# Patient Record
Sex: MALE | Race: WHITE | Employment: FULL TIME | URBAN - METROPOLITAN AREA
[De-identification: names, ages, dates, MRNs, and addresses within clinical notes are randomized per-mention and may not be internally consistent; named-entity substitution may affect disease eponyms.]

---

## 2023-01-09 ENCOUNTER — OFFICE VISIT (OUTPATIENT)
Dept: OCCUPATIONAL MEDICINE | Age: 23
End: 2023-01-09

## 2023-01-09 VITALS
DIASTOLIC BLOOD PRESSURE: 82 MMHG | OXYGEN SATURATION: 98 % | HEART RATE: 75 BPM | SYSTOLIC BLOOD PRESSURE: 134 MMHG | RESPIRATION RATE: 18 BRPM

## 2023-01-09 DIAGNOSIS — M54.9 ACUTE LEFT-SIDED BACK PAIN, UNSPECIFIED BACK LOCATION: Primary | ICD-10-CM

## 2023-01-09 ASSESSMENT — ENCOUNTER SYMPTOMS: BACK PAIN: 1

## 2023-01-09 NOTE — PROGRESS NOTES
PROGRESS NOTE    SUBJECTIVE:   Jocelyne Henriquez is a 20 y.o. male seen for ____. Chief Complaint    Back Pain         Patient presents to clinic today with complaint of left mid back pain for approximately the past 2 weeks. He states that he doesn't recall an exact moment where he could have injured his back, just states he woke one morning at home and was having some back discomfort. He states that he is physically active at home and at work and has continued with his normal activities despite the pain assuming it would resolve on its own. He does admit to chopping and loading wood at home over the weekend that seemed to worsen his back pain. He states today while at work he was lifting a heavy tray and the back pain worsened at that time. He states as he has moved around through the day the back pain has seemed to improve but he states that if he sits down or gets still the back pain will worsen. He states the pain is very \"tight\" feeling in nature. He denies any OTC medication or home intervention. He does state a history of back pain; injured while playing football in high school. He states for that episode he saw a chiropractor for an adjusted with completely alleviated his back pain at that time. He denies any numbness, tingling, gait difficulty, urinary symptoms. No current outpatient medications on file. No current facility-administered medications for this visit. No Known Allergies    Social History     Tobacco Use    Smoking status: Never    Smokeless tobacco: Current        Review of Systems   Genitourinary: Negative. Musculoskeletal:  Positive for back pain (mid lumbar, left of spine). Negative for gait problem. Skin:  Negative for rash and wound. OBJECTIVE:  /82   Pulse 75   Resp 18   SpO2 98%      No results found for this visit on 01/09/23. Physical Exam  Constitutional:       General: He is not in acute distress.      Appearance: He is not ill-appearing. Cardiovascular:      Rate and Rhythm: Normal rate and regular rhythm. Pulses: Normal pulses. Pulmonary:      Effort: Pulmonary effort is normal.   Musculoskeletal:      Cervical back: Normal.      Thoracic back: Normal. No swelling, edema, deformity or signs of trauma. Normal range of motion. Lumbar back: Tenderness (mid, left of spine) present. No swelling, edema, deformity or signs of trauma. Normal range of motion. No scoliosis. Back:    Skin:     General: Skin is warm and dry. Neurological:      Mental Status: He is alert and oriented to person, place, and time. Sensory: Sensation is intact. Motor: Motor function is intact. No weakness. Coordination: Coordination is intact. Gait: Gait is intact. Psychiatric:         Attention and Perception: Attention normal.         Mood and Affect: Mood and affect normal.         Speech: Speech normal.         Behavior: Behavior normal. Behavior is cooperative. Thought Content: Thought content normal.         Cognition and Memory: Cognition normal.         Judgment: Judgment normal.       ASSESSMENT and PLAN    Dheeraj Garcia was seen today for back pain. Diagnoses and all orders for this visit:    Acute left-sided back pain, unspecified back location    Discussed findings of physical examination with patient. Discussed likelihood of musculoskeletal strain/overuse. Educated on OTC therapies and home management including anti-inflammatories conservatively as needed, rest, gentle stretching and range of motion exercises, alternate heat/ice compress, epsom salt soaks, massage. Discussed proper posture and good back mechanics, specifically when lifting. Educated on symptoms that would require urgent evaluation including but not limited to numbness/tingling of extremities, worsening back pain despite interventions, urinary symptoms, gait abnormalities.  Encouraged patient to return to clinic for any new or worsening symptoms as needed. Patient verbalized understanding and was agreeable with the outlined plan of care. Counseling provided on benefits of having a primary care provider which includes but is not limited to continuity of care and having a medical home when concerns arise. Also enforced that onsite clinic policy states that we are not to take the place of a primary care provider. Patientt verbalized understanding. I have reviewed the patient's medication list, past medical, family, social, and surgical history in detail and updated the patient record appropriately.     Rd Rod, APRN - CNP

## 2023-03-17 ENCOUNTER — OFFICE VISIT (OUTPATIENT)
Dept: OCCUPATIONAL MEDICINE | Age: 23
End: 2023-03-17

## 2023-03-17 DIAGNOSIS — Z00.00 WELLNESS EXAMINATION: Primary | ICD-10-CM

## 2023-03-17 NOTE — PROGRESS NOTES
PROGRESS NOTE    SUBJECTIVE:   Parminder Glynn is a 20 y.o. male seen for ____. Chief Complaint    Other         HPI    Patient presents to clinic for quarterly wellness screening as required by employer to receive incentive. Patient voices no complaints or concerns at this time. No current outpatient medications on file. No current facility-administered medications for this visit. No Known Allergies    Social History     Tobacco Use    Smoking status: Never    Smokeless tobacco: Current        Review of Systems       OBJECTIVE:  There were no vitals taken for this visit. No results found for this visit on 03/17/23. Physical Exam    ASSESSMENT and PLAN    Radha Hollis was seen today for other. Diagnoses and all orders for this visit:    Wellness examination      Discussed/reviewed employee wellness program with patient. Discussed all pertinent clinical examination findings/results with patient as applicable. Patient has set personal goal to quit using chewing tobacco. At present he is trying to quit cold turkey. He is currently chewing half a can daily. He stopped for 2 days and then \"out of habit\" relapsed and had a chew one day. His goal is to gradually reduce the amount he chews daily until total cessation. We reviewed in depth tobacco cessation and will evaluate progress at next wellness screening. Patient voiced no questions or concerns at present. Patient is encouraged to return to clinic as needed. Counseling provided on benefits of having a primary care provider which includes but is not limited to continuity of care and having a medical home when concerns arise. Also enforced that onsite clinic policy states that we are not to take the place of a primary care provider. Patientt verbalized understanding. I have reviewed the patient's medication list, past medical, family, social, and surgical history in detail and updated the patient record appropriately.     Lyn Sexton, APRN - CNP

## 2023-06-06 ENCOUNTER — OFFICE VISIT (OUTPATIENT)
Dept: OCCUPATIONAL MEDICINE | Age: 23
End: 2023-06-06

## 2023-06-06 VITALS
WEIGHT: 235.5 LBS | DIASTOLIC BLOOD PRESSURE: 84 MMHG | HEART RATE: 102 BPM | HEIGHT: 71 IN | OXYGEN SATURATION: 98 % | BODY MASS INDEX: 32.97 KG/M2 | SYSTOLIC BLOOD PRESSURE: 124 MMHG | RESPIRATION RATE: 18 BRPM

## 2023-06-06 DIAGNOSIS — Z00.00 WELLNESS EXAMINATION: Primary | ICD-10-CM

## 2023-06-06 NOTE — PROGRESS NOTES
PROGRESS NOTE    SUBJECTIVE:   Lang Perry is a 20 y.o. male seen for ____. Chief Complaint    Other         HPI    Patient presents to clinic for quarterly wellness screening as required by employer to receive incentive. Patient voices no complaints or concerns at this time. Patient has been working to quit chewing tobacco and set a goal to accomplish this at the last quarterly meeting. At this time he is still using chewing tobacco but has significantly reduced the amount that he chews. He is currently down to approximately one chew daily. He states he has noticed some small improvement in his overall well being with this reduction. He is still working towards his goal of complete cessation. He has expressed the desire to lose a little bit of weight. He states because of this his wife has begun cooking healthier meals at home consisting of lean proteins, vegetables. He does verbalized that he usually does not eat breakfast; only lunch and dinner daily. He works a physical job outdoors and remains very active in extracurricular activities such as hunting and dog running that require a good amount of walking/running each weekend. He consumes approximately 8-10 bottles of water on \"hot days\" and uses 2 Gatorade powder mix ins daily for electrolyte replacement. Reviewed labs from recent employee health screening; all within normal limits. No current outpatient medications on file. No current facility-administered medications for this visit. No Known Allergies    Social History     Tobacco Use    Smoking status: Never    Smokeless tobacco: Current        Review of Systems   All other systems reviewed and are negative. OBJECTIVE:  /84   Pulse (!) 102   Resp 18   Ht 5' 11\" (1.803 m)   Wt 235 lb 8 oz (106.8 kg)   SpO2 98%   BMI 32.85 kg/m²      No results found for this visit on 06/06/23.     Physical Exam  Constitutional:       General: He is not in acute

## 2023-09-01 ENCOUNTER — OFFICE VISIT (OUTPATIENT)
Dept: OCCUPATIONAL MEDICINE | Age: 23
End: 2023-09-01

## 2023-09-01 VITALS — SYSTOLIC BLOOD PRESSURE: 122 MMHG | OXYGEN SATURATION: 98 % | DIASTOLIC BLOOD PRESSURE: 86 MMHG | HEART RATE: 66 BPM

## 2023-09-01 DIAGNOSIS — Z00.00 WELLNESS EXAMINATION: Primary | ICD-10-CM

## 2023-09-01 NOTE — PROGRESS NOTES
PROGRESS NOTE    SUBJECTIVE:   Alicia Walker is a 20 y.o. male seen for quarterly wellness exam    Chief Complaint    Other         Other      Patient presents to clinic for quarterly wellness screening as required by employer to receive incentive. Patient voices no complaints or concerns at this time. Patient states he is working towards his goal of quitting chewing tobacco. He states he has significantly cut back and will make a can last 3-4 days. He states he is working on cutting back small amounts at a time until he is able to be tobacco free. He states he rarely gets cravings during the day, typically only at night after dinner. He states he and his wife have began cooking healthier meals at home including lean proteins and veggies. He states they want to start adding in a nightly walk to their routine to incorporate more exercise. He states he is staying well hydrated in the heat and includes electrolyte replacement drinks to help avoid dehydration. No current outpatient medications on file. No current facility-administered medications for this visit. No Known Allergies    Social History     Tobacco Use    Smoking status: Never    Smokeless tobacco: Current        Review of Systems   All other systems reviewed and are negative. OBJECTIVE:  /86   Pulse 66   SpO2 98%      No results found for this visit on 09/01/23. Physical Exam  Constitutional:       Appearance: Normal appearance. Cardiovascular:      Rate and Rhythm: Normal rate and regular rhythm. Pulses: Normal pulses. Heart sounds: Normal heart sounds. Pulmonary:      Effort: Pulmonary effort is normal.      Breath sounds: Normal breath sounds. Neurological:      Mental Status: He is alert. Psychiatric:         Mood and Affect: Mood normal.         Behavior: Behavior normal.         Thought Content:  Thought content normal.         Judgment: Judgment normal.       ASSESSMENT and PLAN    Rosa Bethea was

## 2023-11-28 ENCOUNTER — OFFICE VISIT (OUTPATIENT)
Dept: OCCUPATIONAL MEDICINE | Age: 23
End: 2023-11-28

## 2023-11-28 VITALS — HEART RATE: 68 BPM | DIASTOLIC BLOOD PRESSURE: 88 MMHG | OXYGEN SATURATION: 98 % | SYSTOLIC BLOOD PRESSURE: 126 MMHG

## 2023-11-28 DIAGNOSIS — Z00.00 WELLNESS EXAMINATION: Primary | ICD-10-CM

## 2023-11-28 NOTE — PROGRESS NOTES
PROGRESS NOTE    SUBJECTIVE:   Serg Martinez is a 24 y.o. male seen for     Chief Complaint    wellness screening         HPI    Patient presents to clinic for quarterly wellness screening as required by employer to receive incentive. Patient voices no complaints or concerns at this time. Patient states he is still working on cutting out chewing tobacco. He states he stays well hydrated and has continued with healthy eating habits. No current outpatient medications on file. No current facility-administered medications for this visit. No Known Allergies    Social History     Tobacco Use    Smoking status: Never    Smokeless tobacco: Current        Review of Systems   All other systems reviewed and are negative. OBJECTIVE:  /88   Pulse 68   SpO2 98%      No results found for this visit on 11/28/23. Physical Exam    ASSESSMENT and PLAN    Yogi Jansen was seen today for wellness screening. Diagnoses and all orders for this visit:    Wellness examination        Discussed/reviewed employee wellness program with patient. Discussed all pertinent clinical examination findings/results with patient as applicable. Patient voiced no questions or concerns at present. Patient is encouraged to return to clinic as needed. Counseling provided on benefits of having a primary care provider which includes but is not limited to continuity of care and having a medical home when concerns arise. Also enforced that onsite clinic policy states that we are not to take the place of a primary care provider. Patient verbalized understanding. I have reviewed the patient's medication list, past medical, family, social, and surgical history in detail and updated the patient record appropriately.     BHAKTI Post

## 2024-04-05 ENCOUNTER — OFFICE VISIT (OUTPATIENT)
Age: 24
End: 2024-04-05

## 2024-04-05 VITALS
BODY MASS INDEX: 32.9 KG/M2 | HEIGHT: 71 IN | WEIGHT: 235 LBS | DIASTOLIC BLOOD PRESSURE: 78 MMHG | RESPIRATION RATE: 15 BRPM | SYSTOLIC BLOOD PRESSURE: 123 MMHG

## 2024-04-05 DIAGNOSIS — Z00.00 WELLNESS EXAMINATION: Primary | ICD-10-CM

## 2024-04-05 NOTE — PROGRESS NOTES
PROGRESS NOTE    SUBJECTIVE:   Mike Dave is a 23 y.o. male seen for quarterly wellness examination.    Chief Complaint    Wellness Program         Client presents to the clinic for quarterly wellness visit. He reports feeling well today and denies any chief complaints.       No current outpatient medications on file.     No current facility-administered medications for this visit.      No Known Allergies    Social History     Tobacco Use    Smoking status: Never    Smokeless tobacco: Current        Review of Systems   All other systems reviewed and are negative.         OBJECTIVE:  /78 (Site: Left Upper Arm, Position: Sitting)   Resp 15   Ht 1.803 m (5' 11\")   Wt 106.6 kg (235 lb)   BMI 32.78 kg/m²      No results found for this visit on 04/05/24.    Physical Exam  Constitutional:       Appearance: He is obese.   Cardiovascular:      Rate and Rhythm: Normal rate and regular rhythm.      Heart sounds: Normal heart sounds.   Pulmonary:      Effort: Pulmonary effort is normal.      Breath sounds: Normal breath sounds.   Psychiatric:         Behavior: Behavior is cooperative.       ASSESSMENT and PLAN    Mike was seen today for wellness program.    Diagnoses and all orders for this visit:    Wellness examination    Client and I discussed the importance of a healthy diet and getting in adequate exercise. His goal for the next 3 months is to begin weight lifting 2-3 times per week. We discussed the importance of weight bearing activities to increase/maintain muscle mass. He is also going to increase fruit and vegetable intake along with lean protein intake. We discussed having hard boiled eggs for breakfast and the importance of not skipping meals. Client stopped drinking soft drinks a few weeks after previously drinking multiple a day. He reports feeling much better since he stopped drinking soft drinks. He is more tired in the mornings. We discussed trying an 8 oz cup of coffee in the morning.

## 2024-09-03 ENCOUNTER — OFFICE VISIT (OUTPATIENT)
Age: 24
End: 2024-09-03

## 2024-09-03 VITALS
RESPIRATION RATE: 16 BRPM | BODY MASS INDEX: 34.42 KG/M2 | DIASTOLIC BLOOD PRESSURE: 89 MMHG | OXYGEN SATURATION: 97 % | WEIGHT: 246.8 LBS | HEART RATE: 93 BPM | SYSTOLIC BLOOD PRESSURE: 138 MMHG

## 2024-09-03 DIAGNOSIS — Z00.00 WELLNESS EXAMINATION: Primary | ICD-10-CM

## 2024-09-03 ASSESSMENT — ENCOUNTER SYMPTOMS: RESPIRATORY NEGATIVE: 1

## 2024-09-03 NOTE — PROGRESS NOTES
9/3/2024    Mike Dave (:  2000) is a 23 y.o. male, here for quarterly wellness examination for insurance purposes offered through his company.    Subjective   There is no problem list on file for this patient.  Client reports feeling well today and denies any chief complaints.     Review of Systems   Constitutional: Negative.    Respiratory: Negative.     Cardiovascular: Negative.    Psychiatric/Behavioral: Negative.       Prior to Visit Medications    Not on File        No Known Allergies    History reviewed. No pertinent past medical history.    Past Surgical History:   Procedure Laterality Date    HERNIA REPAIR      TONSILLECTOMY       History reviewed. No pertinent family history.      Vitals:    24 0849   BP: 138/89   Site: Right Upper Arm   Position: Sitting   Pulse: 93   Resp: 16   SpO2: 97%   Weight: 111.9 kg (246 lb 12.8 oz)     Estimated body mass index is 34.42 kg/m² as calculated from the following:    Height as of 24: 1.803 m (5' 11\").    Weight as of this encounter: 111.9 kg (246 lb 12.8 oz).       Objective   Physical Exam  Constitutional:       Appearance: He is obese.   Cardiovascular:      Rate and Rhythm: Normal rate and regular rhythm.      Heart sounds: Normal heart sounds.   Pulmonary:      Effort: Pulmonary effort is normal.      Breath sounds: Normal breath sounds.   Neurological:      Mental Status: He is alert and oriented to person, place, and time.   Psychiatric:         Mood and Affect: Mood normal.         Behavior: Behavior normal.          No data to display              Assessment & Plan  Wellness examination   Client presents to the clinic regarding quarterly wellness examination. His wellness goal is to increase fruit and vegetable intake and to eat healthier. He eats a lot of sandwiches and chips. We discussed swapping these out for healthier choices. We also discussed getting adequate amounts of exercise; this may include walking with his wife in

## 2024-10-15 ENCOUNTER — OFFICE VISIT (OUTPATIENT)
Age: 24
End: 2024-10-15

## 2024-10-15 VITALS
HEIGHT: 71 IN | SYSTOLIC BLOOD PRESSURE: 128 MMHG | HEART RATE: 60 BPM | OXYGEN SATURATION: 98 % | DIASTOLIC BLOOD PRESSURE: 84 MMHG | WEIGHT: 245.4 LBS | BODY MASS INDEX: 34.35 KG/M2

## 2024-10-15 DIAGNOSIS — Z00.8 ENCOUNTER FOR BIOMETRIC SCREENING: Primary | ICD-10-CM

## 2024-10-15 ASSESSMENT — ENCOUNTER SYMPTOMS: RESPIRATORY NEGATIVE: 1

## 2024-10-15 NOTE — PROGRESS NOTES
PROGRESS NOTE    SUBJECTIVE:   Mike Dave is a 23 y.o. male seen for biometric screening.    Chief Complaint    Biometric Screening           History provided by:  Patient  History limited by: none.   used: No      No current outpatient medications on file.     No current facility-administered medications for this visit.      No Known Allergies    Social History     Tobacco Use   • Smoking status: Never   • Smokeless tobacco: Current        Review of Systems   Constitutional: Negative.    Respiratory: Negative.     Cardiovascular: Negative.    Neurological: Negative.         OBJECTIVE:  /84 (Site: Right Upper Arm, Position: Sitting)   Pulse 60   Ht 1.803 m (5' 11\")   Wt 111.3 kg (245 lb 6.4 oz)   SpO2 98%   BMI 34.23 kg/m²      No results found for this visit on 10/15/24.    Physical Exam  Constitutional:       Appearance: Normal appearance. He is normal weight.   Cardiovascular:      Rate and Rhythm: Normal rate and regular rhythm.      Heart sounds: Normal heart sounds.   Pulmonary:      Effort: Pulmonary effort is normal.      Breath sounds: Normal breath sounds.   Neurological:      Mental Status: He is oriented to person, place, and time.   Psychiatric:         Mood and Affect: Mood normal.         Behavior: Behavior normal.         Thought Content: Thought content normal.         Judgment: Judgment normal.     ASSESSMENT and PLAN    Mike was seen today for biometric screening.    Diagnoses and all orders for this visit:    Encounter for biometric screening  -     Comprehensive Metabolic Panel  -     CBC with Auto Differential  -     Lipid Panel W/ Chol/HDL Ratio  -     Hemoglobin A1C  -     Thyroid Cascade Profile  -     PSA, Diagnostic    Client presents to the clinic for biometric screening today. He reports feeling well and denies any chief complaints. Labs obtained through venipuncture in [facility] with no issue. Client will follow up in clinic on 10/22/2024 at 7:00 a.m. to

## 2024-10-16 LAB
BASOPHILS # BLD AUTO: 0 X10E3/UL (ref 0–0.2)
BASOPHILS NFR BLD AUTO: 0 %
CHOLEST SERPL-MCNC: 165 MG/DL (ref 100–199)
CHOLEST/HDLC SERPL: 3.4 RATIO (ref 0–5)
EOSINOPHIL # BLD AUTO: 0.1 X10E3/UL (ref 0–0.4)
EOSINOPHIL NFR BLD AUTO: 1 %
ERYTHROCYTE [DISTWIDTH] IN BLOOD BY AUTOMATED COUNT: 11.7 % (ref 11.6–15.4)
HBA1C MFR BLD: 5.1 % (ref 4.8–5.6)
HCT VFR BLD AUTO: 49 % (ref 37.5–51)
HDLC SERPL-MCNC: 49 MG/DL
HGB BLD-MCNC: 17.2 G/DL (ref 13–17.7)
IMM GRANULOCYTES # BLD AUTO: 0 X10E3/UL (ref 0–0.1)
IMM GRANULOCYTES NFR BLD AUTO: 0 %
LDLC SERPL CALC-MCNC: 104 MG/DL (ref 0–99)
LYMPHOCYTES # BLD AUTO: 2.5 X10E3/UL (ref 0.7–3.1)
LYMPHOCYTES NFR BLD AUTO: 35 %
MCH RBC QN AUTO: 33 PG (ref 26.6–33)
MCHC RBC AUTO-ENTMCNC: 35.1 G/DL (ref 31.5–35.7)
MCV RBC AUTO: 94 FL (ref 79–97)
MONOCYTES # BLD AUTO: 0.6 X10E3/UL (ref 0.1–0.9)
MONOCYTES NFR BLD AUTO: 8 %
NEUTROPHILS # BLD AUTO: 4 X10E3/UL (ref 1.4–7)
NEUTROPHILS NFR BLD AUTO: 56 %
PLATELET # BLD AUTO: 253 X10E3/UL (ref 150–450)
RBC # BLD AUTO: 5.21 X10E6/UL (ref 4.14–5.8)
TRIGL SERPL-MCNC: 62 MG/DL (ref 0–149)
TSH SERPL DL<=0.005 MIU/L-ACNC: 2.49 UIU/ML (ref 0.45–4.5)
VLDLC SERPL CALC-MCNC: 12 MG/DL (ref 5–40)
WBC # BLD AUTO: 7.2 X10E3/UL (ref 3.4–10.8)

## 2024-10-17 ENCOUNTER — TELEPHONE (OUTPATIENT)
Age: 24
End: 2024-10-17

## 2024-10-17 NOTE — TELEPHONE ENCOUNTER
I spoke with Loi on the phone to let him know that the tests that have resulted were not concerning but to please follow up with Ayesha in the onsite clinic at [facility] as planned. We rescheduled his appointment for 11/4 at 7am. I told him we would call if the calcium or the PSA were out of range as they are still pending.

## 2024-10-18 LAB
ALBUMIN SERPL-MCNC: 4.8 G/DL (ref 4.3–5.2)
ALP SERPL-CCNC: 89 IU/L (ref 44–121)
ALT SERPL-CCNC: 32 IU/L (ref 0–44)
AST SERPL-CCNC: 22 IU/L (ref 0–40)
BILIRUB SERPL-MCNC: 0.7 MG/DL (ref 0–1.2)
BUN SERPL-MCNC: 6 MG/DL (ref 6–20)
BUN/CREAT SERPL: 7 (ref 9–20)
CALCIUM SERPL-MCNC: 9.2 MG/DL (ref 8.7–10.2)
CHLORIDE SERPL-SCNC: 102 MMOL/L (ref 96–106)
CO2 SERPL-SCNC: 24 MMOL/L (ref 20–29)
CREAT SERPL-MCNC: 0.91 MG/DL (ref 0.76–1.27)
EGFRCR SERPLBLD CKD-EPI 2021: 121 ML/MIN/1.73
GLOBULIN SER CALC-MCNC: 2 G/DL (ref 1.5–4.5)
GLUCOSE SERPL-MCNC: 91 MG/DL (ref 70–99)
POTASSIUM SERPL-SCNC: 4 MMOL/L (ref 3.5–5.2)
PROT SERPL-MCNC: 6.8 G/DL (ref 6–8.5)
PSA SERPL-MCNC: 0.4 NG/ML (ref 0–4)
SODIUM SERPL-SCNC: 140 MMOL/L (ref 134–144)

## 2024-11-05 ENCOUNTER — OFFICE VISIT (OUTPATIENT)
Age: 24
End: 2024-11-05

## 2024-11-05 VITALS
OXYGEN SATURATION: 97 % | SYSTOLIC BLOOD PRESSURE: 139 MMHG | RESPIRATION RATE: 15 BRPM | HEART RATE: 68 BPM | DIASTOLIC BLOOD PRESSURE: 85 MMHG

## 2024-11-05 DIAGNOSIS — Z71.2 ENCOUNTER TO DISCUSS TEST RESULTS: Primary | ICD-10-CM

## 2024-11-05 DIAGNOSIS — Z00.00 WELLNESS EXAMINATION: ICD-10-CM

## 2024-11-05 ASSESSMENT — ENCOUNTER SYMPTOMS: RESPIRATORY NEGATIVE: 1

## 2025-06-17 ENCOUNTER — OFFICE VISIT (OUTPATIENT)
Age: 25
End: 2025-06-17

## 2025-06-17 VITALS
OXYGEN SATURATION: 98 % | DIASTOLIC BLOOD PRESSURE: 78 MMHG | HEART RATE: 92 BPM | SYSTOLIC BLOOD PRESSURE: 120 MMHG | RESPIRATION RATE: 16 BRPM

## 2025-06-17 DIAGNOSIS — Z00.00 WELLNESS EXAMINATION: Primary | ICD-10-CM

## 2025-06-17 ASSESSMENT — ENCOUNTER SYMPTOMS
CHEST TIGHTNESS: 0
SHORTNESS OF BREATH: 0

## 2025-06-17 NOTE — PROGRESS NOTES
[facility]  ONSITE CLINIC  PROGRESS NOTE      PROGRESS NOTE    SUBJECTIVE:   Mike Dave is a 24 y.o. male seen for ____.    Chief Complaint    Quarterly Wellness Exam         HPI    Patient presents to clinic for quarterly wellness screening as required by employer to receive incentive. Patient voices no complaints or concerns at this time.     His last wellness goal was to increase fruit and vegetable intake and to eat healthier. He reports he did not meet this goal and plans to make it a goal for next quarter.  He reports meeting his goal of adequate amounts of exercise. Mike reports he is walking more frequently in the evenings.       No current outpatient medications on file.     No current facility-administered medications for this visit.      No Known Allergies    Social History     Tobacco Use    Smoking status: Never    Smokeless tobacco: Current        Review of Systems   Constitutional:  Negative for chills, fatigue and fever.   Respiratory:  Negative for chest tightness and shortness of breath.    Cardiovascular:  Negative for chest pain.   Musculoskeletal:  Negative for myalgias.   Neurological:  Negative for dizziness, weakness, light-headedness and headaches.   All other systems reviewed and are negative.         OBJECTIVE:  /78 (BP Site: Right Upper Arm, Patient Position: Sitting, BP Cuff Size: Medium Adult)   Pulse 92   Resp 16   SpO2 98%      No results found for this visit on 06/17/25.    Physical Exam  Vitals reviewed.   Constitutional:       General: He is not in acute distress.     Appearance: Normal appearance. He is not ill-appearing.   Cardiovascular:      Rate and Rhythm: Normal rate and regular rhythm.   Pulmonary:      Effort: Pulmonary effort is normal. No respiratory distress.   Skin:     General: Skin is warm and dry.   Neurological:      General: No focal deficit present.      Mental Status: He is alert and oriented to person, place, and time.      Motor: No

## 2025-08-26 ENCOUNTER — OFFICE VISIT (OUTPATIENT)
Age: 25
End: 2025-08-26

## 2025-08-26 DIAGNOSIS — Z00.00 WELLNESS EXAMINATION: Primary | ICD-10-CM
